# Patient Record
Sex: FEMALE | Race: WHITE | NOT HISPANIC OR LATINO | URBAN - METROPOLITAN AREA
[De-identification: names, ages, dates, MRNs, and addresses within clinical notes are randomized per-mention and may not be internally consistent; named-entity substitution may affect disease eponyms.]

---

## 2017-04-27 ENCOUNTER — TELEPHONE (OUTPATIENT)
Dept: TRANSPLANT | Facility: CLINIC | Age: 57
End: 2017-04-27

## 2017-04-27 NOTE — TELEPHONE ENCOUNTER
"Logged in as: [email or web address]. Logout.  Incomplete   Pending   Registered   Archived Change Log Done [facility]ney Donor Evaluation Completed: 2017 17:43:34 CT Updated: 2017 17:44:20 CT  Donor Name: Lilly Stroud MRN: [id number] Note: : 1960 Age: 56Gender: Female Donor Height: 5  6\" Weight (lb): 175 BMI: 28.3  Donor Race:  Ethnicity: Not / Donor Preferred Language: English  Required?: No Current Marital Status:   Demographics: Home Address: [address] [place] [address] City: [place] State: [place] Zip: [address] Country: [place]  Best Phone: [phone]  Alt Phone: Donor Email: [email or web address] Phone Type: Mobile Alt Phone Type:   Preferred Contact Time(s): 09:00 AM-11:00 AM Preferred Contact Day(s):   Donor Screen: PASSED Donor Referred by: [place] Candidate Donor self reported ABO: Unknown  Recipient Information: Recipient Name (Last, First): Margaret Cunningham Recipient :    12/10/1962  ... Donor Relationship: Not related (Other) Recipient Diagnosis: Recipient ABO:   MEDICAL HISTORY:  \"Constipation, colitis\"  Depression  Herniated Disc (Lumbar)  Low Back Pain  Spinal Stenosis (Cervical)  MEDICATIONS:  None Reported  SURGICAL HISTORY:  Lumbar Laminectomy and/or Discectomy  ALLERGIES:  Penicillin : Edema  SOCIAL HISTORY:  EtOH: Denies  History of EtOH abuse  Illicit Drug Use: Denies  Tobacco: Remote; Quit ; (1 ppd x 10 years)  SELF-REPORTED FUNCTIONAL STATUS:  \"I am able to participate in moderate recreational activities like golf, double tennis, dancing, throwing a baseball or football\"  Exercise (1 X per week)  REVIEW OF ORGAN SYSTEMS: Airway or Lungs: No Blood Disorder: No Cancer: No Diabetes,Thyroid,Adrenal,Endocrine Disorder: No Digestive or Liver: Yes Female Health: No Heart or Circulatory System: No Immune Diseases: No Kidneys and Bladder: No Muscles,Bones,Joints: Yes Neuro: No Psych: Yes  FAMILY HISTORY: Confirmed:  Denied:  Cancer " (denies)  Diabetes (denies)  Heart Disease (denies)  Hypertension (denies)  Kidney Disease (denies)  Kidney Stones (denies)  DONOR INFORMATION:  Level of Education: College or baccalaureate degree complete Employment Status: Full Time Employer: [employer] Medical Insurance Status: Has medical insurance Current Accommodation: Owns own home/apartment Living Arrangement: Alone Allow Disclosure to Recipient: No Paired Kidney Exchange Education Level: General idea Paired Kidney Exchange Participation Consent: Unsure Donor Motivation: Ready to start evaluation with reservations  HIGH RISK BEHAVIOR:  Blood transfusion < 12 months. (NO)  Commercial sex < 12 months. (NO)  Illicit IV drug use < 5yrs. (NO)  Other high risk sexual contact < 12 months. (NO)  EMERGENCY CONTACT INFORMATION:  Primary Secondary First Name: William Last Name: Dhiraj Phone Number: [phone] Relationship: Sibling  First Name: Modesta  Last Name: Ted Phone Number: [phone]  Relationship: Child  REASON FOR DONATION:   Her grand kids need her.  PHYSICIAN CONTACT INFORMATION:  PCP  Name: Morales Schneider   City: [place] State: [place]  Phone: [phone]  ADDITIONAL NOTES:   REVIEWED BY:    Danni  TODAY'S DATE:   04/27/2017  ... Done  Her grand kids need her.

## 2017-04-28 NOTE — TELEPHONE ENCOUNTER
Unknown abo. Will think over PEP. Lives in [place].Hx Spinal Stenosis and Herniated Disc--had Lumbar Laminectomy 2003. Can lie on back now without any problems. Had hx ETOH abuse in teens and early 20's--nothing now. Will now send all Phase 1 orders with donor pkt.